# Patient Record
Sex: FEMALE | Race: WHITE | Employment: STUDENT | URBAN - METROPOLITAN AREA
[De-identification: names, ages, dates, MRNs, and addresses within clinical notes are randomized per-mention and may not be internally consistent; named-entity substitution may affect disease eponyms.]

---

## 2021-04-10 ENCOUNTER — HOSPITAL ENCOUNTER (EMERGENCY)
Age: 31
Discharge: HOME | End: 2021-04-11
Payer: OTHER GOVERNMENT

## 2021-04-10 ENCOUNTER — APPOINTMENT (OUTPATIENT)
Dept: GENERAL RADIOLOGY | Age: 31
End: 2021-04-10
Attending: EMERGENCY MEDICINE
Payer: OTHER GOVERNMENT

## 2021-04-10 VITALS
RESPIRATION RATE: 16 BRPM | SYSTOLIC BLOOD PRESSURE: 150 MMHG | HEIGHT: 66 IN | OXYGEN SATURATION: 97 % | HEART RATE: 72 BPM | DIASTOLIC BLOOD PRESSURE: 95 MMHG | BODY MASS INDEX: 37.77 KG/M2 | WEIGHT: 235 LBS | TEMPERATURE: 97.7 F

## 2021-04-10 DIAGNOSIS — M79.671 RIGHT FOOT PAIN: Primary | ICD-10-CM

## 2021-04-10 DIAGNOSIS — Z98.890 STATUS POST SURGERY: ICD-10-CM

## 2021-04-10 DIAGNOSIS — M21.961 DEFORMITY OF RIGHT FOOT: ICD-10-CM

## 2021-04-10 PROCEDURE — 73630 X-RAY EXAM OF FOOT: CPT

## 2021-04-10 PROCEDURE — 99283 EMERGENCY DEPT VISIT LOW MDM: CPT

## 2021-04-10 RX ORDER — IBUPROFEN 800 MG/1
800 TABLET ORAL
Status: DISCONTINUED | OUTPATIENT
Start: 2021-04-10 | End: 2021-04-11

## 2021-04-10 RX ORDER — IBUPROFEN 800 MG/1
800 TABLET ORAL
Status: COMPLETED | OUTPATIENT
Start: 2021-04-10 | End: 2021-04-11

## 2021-04-11 PROCEDURE — 74011250637 HC RX REV CODE- 250/637: Performed by: NURSE PRACTITIONER

## 2021-04-11 RX ORDER — IBUPROFEN 600 MG/1
600 TABLET ORAL
Qty: 20 TAB | Refills: 0 | OUTPATIENT
Start: 2021-04-11 | End: 2021-04-25

## 2021-04-11 RX ADMIN — IBUPROFEN 800 MG: 800 TABLET, FILM COATED ORAL at 00:28

## 2021-04-11 NOTE — DISCHARGE INSTRUCTIONS
Thank you! Thank you for allowing me to care for you in the emergency department. I sincerely hope that you are satisfied with your visit today. It is my goal to provide you with excellent care. Below you will find a list of your labs and imaging from your visit today. Should you have any questions regarding these results please do not hesitate to call the emergency department. Labs -   No results found for this or any previous visit (from the past 12 hour(s)). Radiologic Studies -   XR FOOT RT MIN 3 V   Final Result        CT Results  (Last 48 hours)      None          CXR Results  (Last 48 hours)      None               If you feel that you have not received excellent quality care or timely care, please ask to speak to the nurse manager. Please choose us in the future for your continued health care needs. ------------------------------------------------------------------------------------------------------------  The exam and treatment you received in the Emergency Department were for an urgent problem and are not intended as complete care. It is important that you follow-up with a doctor, nurse practitioner, or physician assistant to:  (1) confirm your diagnosis,  (2) re-evaluation of changes in your illness and treatment, and  (3) for ongoing care. If your symptoms become worse or you do not improve as expected and you are unable to reach your usual health care provider, you should return to the Emergency Department. We are available 24 hours a day. Please take your discharge instructions with you when you go to your follow-up appointment. If you have any problem arranging a follow-up appointment, contact the Emergency Department immediately. If a prescription has been provided, please have it filled as soon as possible to prevent a delay in treatment. Read the entire medication instruction sheet provided to you by the pharmacy.  If you have any questions or reservations about taking the medication due to side effects or interactions with other medications, please call your primary care physician or contact the ER to speak with the charge nurse. Make an appointment with your family doctor or the physician you were referred to for follow-up of this visit as instructed on your discharge paperwork, as this is a mandatory follow-up. Return to the ER if you are unable to be seen or if you are unable to be seen in a timely manner. If you have any problem arranging the follow-up visit, contact the Emergency Department immediately.

## 2021-04-11 NOTE — ED PROVIDER NOTES
EMERGENCY DEPARTMENT HISTORY AND PHYSICAL EXAM      Date: 4/10/2021  Patient Name: Swetha Santoyo    History of Presenting Illness     Chief Complaint   Patient presents with    Foot Pain       History Provided By: Patient    HPI: Swetha Santoyo, 35 y.o. female with a past medical history significant surgery to right foot  Months ago presents to the ED with cc of fracture to right foot. Patient complains of pain to the lateral side of the right foot 7 out of 10, constant, aggravated with ambulation and palpation. She reports Monday was moving from [facility] to [place] when she turned when her foot turned and twisted on the ground she felt the snap. She reports being evaluated with advised of fracture given a walking boot and crutches and need to follow-up with orthopedic once she relocated. She reports being given hydrocodone for with a 4-day supply out of medications continues with pain. She reports use of naproxen with no improvement mild numbness and tingling to lower extremities with edema. She denies any shortness of breath, chest pain, fever, chills, drainage, warmth, abrasion, or laceration. There are no other complaints, changes, or physical findings at this time. PCP: No primary care provider on file. No current facility-administered medications on file prior to encounter. No current outpatient medications on file prior to encounter. Past History     Past Medical History:  Past Medical History:   Diagnosis Date    Arthritis     Hashimoto's disease     Mastocytosis        Past Surgical History:  Past Surgical History:   Procedure Laterality Date    HX GI      gallbladder    HX GYN      c section and tubal ligation    HX ORTHOPAEDIC         Family History:  History reviewed. No pertinent family history.     Social History:  Social History     Tobacco Use    Smoking status: Former Smoker    Smokeless tobacco: Never Used    Tobacco comment: quit 10 years ago 2011 Substance Use Topics    Alcohol use: Never     Frequency: Never    Drug use: Not on file       Allergies: Allergies   Allergen Reactions    Latex Hives    Pcn [Penicillins] Anaphylaxis    Percocet [Oxycodone-Acetaminophen] Anaphylaxis    Tetanus Vaccines And Toxoid Anaphylaxis         Review of Systems     Review of Systems   Constitutional: Negative for chills and fever. HENT: Negative for congestion, sinus pressure and sinus pain. Respiratory: Negative for cough and shortness of breath. Cardiovascular: Negative for chest pain and leg swelling. Gastrointestinal: Negative for abdominal pain, nausea and vomiting. Genitourinary: Negative for dysuria, frequency and urgency. Musculoskeletal: Positive for arthralgias and gait problem. Negative for myalgias. Skin: Negative. Neurological: Negative for dizziness, weakness, light-headedness, numbness and headaches. Psychiatric/Behavioral: Negative. Physical Exam     Physical Exam  Vitals signs and nursing note reviewed. Constitutional:       General: She is not in acute distress. Appearance: Normal appearance. She is normal weight. She is not ill-appearing or toxic-appearing. HENT:      Head: Normocephalic and atraumatic. Right Ear: Hearing normal.      Left Ear: Hearing normal.      Nose: Nose normal.      Mouth/Throat:      Mouth: Mucous membranes are moist.   Eyes:      General: Lids are normal.      Extraocular Movements: Extraocular movements intact. Pupils: Pupils are equal, round, and reactive to light. Neck:      Musculoskeletal: Normal range of motion and neck supple. No muscular tenderness. Cardiovascular:      Rate and Rhythm: Normal rate and regular rhythm. Pulses: Normal pulses. Radial pulses are 2+ on the right side and 2+ on the left side. Dorsalis pedis pulses are 2+ on the right side and 2+ on the left side.    Pulmonary:      Effort: Pulmonary effort is normal. No accessory muscle usage or respiratory distress. Breath sounds: Normal breath sounds. No wheezing or rhonchi. Abdominal:      General: Bowel sounds are normal.      Palpations: Abdomen is soft. Tenderness: There is no abdominal tenderness. There is no right CVA tenderness or left CVA tenderness. Musculoskeletal:      Right lower leg: No edema. Left lower leg: No edema. Feet:      Right foot:      Skin integrity: No skin breakdown, erythema or warmth. Left foot:      Skin integrity: No skin breakdown, erythema or warmth. Comments: Skin intact, surgical scar present. No erythema, edema, warmth, drainage, abrasion, laceration present to site. Sensation presnt bilaterally, 2+ pedal and popliteal pulses. Pain with palpation and flexion and extension foot. Skin:     General: Skin is warm and dry. Capillary Refill: Capillary refill takes less than 2 seconds. Findings: No abrasion, bruising, ecchymosis, erythema or signs of injury. Neurological:      Mental Status: She is alert and oriented to person, place, and time. GCS: GCS eye subscore is 4. GCS verbal subscore is 5. GCS motor subscore is 6. Cranial Nerves: Cranial nerves are intact. Sensory: Sensation is intact. Psychiatric:         Attention and Perception: Attention normal.         Mood and Affect: Mood normal.         Behavior: Behavior normal. Behavior is cooperative. Cognition and Memory: Cognition normal.          Lab and Diagnostic Study Results     Labs -   No results found for this or any previous visit (from the past 12 hour(s)). Radiologic Studies -   XR Results (most recent):  Results from Hospital Encounter encounter on 04/10/21   XR FOOT RT MIN 3 V    Narrative Indication: Fracture. 3 views right foot 4/10/2021. No comparison. Osteotomy with bridging staple proximal aspect first proximal phalanx. Lateral  view of retained calculus is not apparent.   Postsurgical deformity distal aspect first metatarsal bone. Osteotomy of distal second metatarsal bone with overlapping components,  foreshortening and a vertical transverse screw in the neck. There is an intramedullary indu, with distal transverse screw, associated with  osteotomy of fifth metatarsal bone. There is lucency surrounding the metal  components consistent with loosening. The distal osteotomy bone component is  nearly one shaft width displaced medially, without apparent bridging callus. CT Results  (Last 48 hours)    None        CXR Results  (Last 48 hours)    None            Medical Decision Making   - I am the first provider for this patient. - I reviewed the vital signs, available nursing notes, past medical history, past surgical history, family history and social history. - Initial assessment performed. The patients presenting problems have been discussed, and they are in agreement with the care plan formulated and outlined with them. I have encouraged them to ask questions as they arise throughout their visit. Vital Signs-Reviewed the patient's vital signs. Patient Vitals for the past 12 hrs:   Temp Pulse Resp BP SpO2   04/10/21 2247 97.7 °F (36.5 °C) 72 16 (!) 150/95 97 %       The patient presents with foot pain with a differential diagnosis of surgical infection, hardware displacement, fx,        ED Course:          Provider Notes (Medical Decision Making):   NO acute fracture noted. Displacement of medal hardware reported. Pt given referral to ortho. Advised to continue with supportive care, walking boot, crutches. Pt verbalized understanding. Stable at time of discharge. Procedures   Medical Decision Makingedical Decision Making  Performed by: Cory Ying NP  PROCEDURES:  Procedures       Disposition   Disposition: DC- Adult Discharges: All of the diagnostic tests were reviewed and questions answered. Diagnosis, care plan and treatment options were discussed.   The patient understands the instructions and will follow up as directed. The patients results have been reviewed with them. They have been counseled regarding their diagnosis. The patient verbally convey understanding and agreement of the signs, symptoms, diagnosis, treatment and prognosis and additionally agrees to follow up as recommended with their PCP in 24 - 48 hours. They also agree with the care-plan and convey that all of their questions have been answered. I have also put together some discharge instructions for them that include: 1) educational information regarding their diagnosis, 2) how to care for their diagnosis at home, as well a 3) list of reasons why they would want to return to the ED prior to their follow-up appointment, should their condition change. DISCHARGE PLAN:  1. There are no discharge medications for this patient. 2.   Follow-up Information     Follow up With Specialties Details Why Contact Info    Iram Galeano MD Orthopedic Surgery Schedule an appointment as soon as possible for a visit in 1 week  [place] [address]  [phone]          3. Return to ED if worse   4. Current Discharge Medication List      START taking these medications    Details   ibuprofen (MOTRIN) 600 mg tablet Take 1 Tab by mouth every six (6) hours as needed for Pain. Qty: 20 Tab, Refills: 0               Diagnosis     Clinical Impression:   1. Right foot pain    2. Status post surgery    3. Deformity of right foot        Attestations:    Sunny Hein NP    Please note that this dictation was completed with [employer], the computer voice recognition software. Quite often unanticipated grammatical, syntax, homophones, and other interpretive errors are inadvertently transcribed by the computer software. Please disregard these errors. Please excuse any errors that have escaped final proofreading. Thank you.

## 2021-04-11 NOTE — ED TRIAGE NOTES
Pt has a broke foot foot was broken about 1 week ago pt denies any trauma has a hx of RA.  Pt in [employer]

## 2021-04-25 ENCOUNTER — HOSPITAL ENCOUNTER (EMERGENCY)
Age: 31
Discharge: HOME | End: 2021-04-25
Payer: OTHER GOVERNMENT

## 2021-04-25 VITALS
HEART RATE: 61 BPM | WEIGHT: 230 LBS | OXYGEN SATURATION: 99 % | DIASTOLIC BLOOD PRESSURE: 72 MMHG | HEIGHT: 66 IN | TEMPERATURE: 97.9 F | SYSTOLIC BLOOD PRESSURE: 141 MMHG | BODY MASS INDEX: 36.96 KG/M2 | RESPIRATION RATE: 16 BRPM

## 2021-04-25 DIAGNOSIS — M54.41 LOW BACK PAIN WITH RIGHT-SIDED SCIATICA, UNSPECIFIED BACK PAIN LATERALITY, UNSPECIFIED CHRONICITY: ICD-10-CM

## 2021-04-25 DIAGNOSIS — M79.651 ACUTE PAIN OF RIGHT THIGH: Primary | ICD-10-CM

## 2021-04-25 DIAGNOSIS — Z98.890 STATUS POST RIGHT FOOT SURGERY: ICD-10-CM

## 2021-04-25 PROCEDURE — 99284 EMERGENCY DEPT VISIT MOD MDM: CPT

## 2021-04-25 PROCEDURE — 74011250636 HC RX REV CODE- 250/636: Performed by: NURSE PRACTITIONER

## 2021-04-25 PROCEDURE — 74011250637 HC RX REV CODE- 250/637: Performed by: NURSE PRACTITIONER

## 2021-04-25 PROCEDURE — 96372 THER/PROPH/DIAG INJ SC/IM: CPT

## 2021-04-25 RX ORDER — NAPROXEN 500 MG/1
1 TABLET, DELAYED RELEASE ORAL 2 TIMES DAILY
COMMUNITY
Start: 2021-04-14

## 2021-04-25 RX ORDER — LEVOTHYROXINE SODIUM 150 UG/1
1 TABLET ORAL DAILY
COMMUNITY
Start: 2021-04-14

## 2021-04-25 RX ORDER — CYCLOBENZAPRINE HCL 10 MG
10 TABLET ORAL
Status: COMPLETED | OUTPATIENT
Start: 2021-04-25 | End: 2021-04-25

## 2021-04-25 RX ORDER — HYDROCODONE BITARTRATE AND ACETAMINOPHEN 5; 325 MG/1; MG/1
2 TABLET ORAL
Status: COMPLETED | OUTPATIENT
Start: 2021-04-25 | End: 2021-04-25

## 2021-04-25 RX ORDER — PREGABALIN 150 MG/1
1 CAPSULE ORAL 2 TIMES DAILY
COMMUNITY
Start: 2021-04-14

## 2021-04-25 RX ORDER — NAPROXEN 500 MG/1
500 TABLET ORAL
Status: COMPLETED | OUTPATIENT
Start: 2021-04-25 | End: 2021-04-25

## 2021-04-25 RX ORDER — CYCLOBENZAPRINE HCL 10 MG
10 TABLET ORAL
Qty: 15 TAB | Refills: 0 | Status: SHIPPED | OUTPATIENT
Start: 2021-04-25 | End: 2021-04-30

## 2021-04-25 RX ORDER — LEFLUNOMIDE 20 MG/1
1 TABLET ORAL DAILY
COMMUNITY
Start: 2021-04-14

## 2021-04-25 RX ORDER — HYDROXYCHLOROQUINE SULFATE 200 MG/1
1 TABLET, FILM COATED ORAL 2 TIMES DAILY
COMMUNITY
Start: 2021-04-14

## 2021-04-25 RX ORDER — PREDNISONE 5 MG/1
1 TABLET ORAL
COMMUNITY
Start: 2021-04-14

## 2021-04-25 RX ORDER — HYDROMORPHONE HYDROCHLORIDE 1 MG/ML
1 INJECTION, SOLUTION INTRAMUSCULAR; INTRAVENOUS; SUBCUTANEOUS ONCE
Status: COMPLETED | OUTPATIENT
Start: 2021-04-25 | End: 2021-04-25

## 2021-04-25 RX ORDER — HYDROCODONE BITARTRATE AND ACETAMINOPHEN 7.5; 325 MG/1; MG/1
TABLET ORAL
COMMUNITY
Start: 2021-04-15 | End: 2021-05-02

## 2021-04-25 RX ADMIN — HYDROCODONE BITARTRATE AND ACETAMINOPHEN 2 TABLET: 5; 325 TABLET ORAL at 19:08

## 2021-04-25 RX ADMIN — NAPROXEN 500 MG: 500 TABLET ORAL at 19:08

## 2021-04-25 RX ADMIN — METHYLPREDNISOLONE SODIUM SUCCINATE 125 MG: 125 INJECTION, POWDER, FOR SOLUTION INTRAMUSCULAR; INTRAVENOUS at 20:45

## 2021-04-25 RX ADMIN — HYDROMORPHONE HYDROCHLORIDE 1 MG: 1 INJECTION, SOLUTION INTRAMUSCULAR; INTRAVENOUS; SUBCUTANEOUS at 20:57

## 2021-04-25 RX ADMIN — CYCLOBENZAPRINE 10 MG: 10 TABLET, FILM COATED ORAL at 19:07

## 2021-04-25 NOTE — ED TRIAGE NOTES
Foot surgery on Friday at [facility]. Now noticing increased swelling and pain all the way up to thigh. Called surgeon prior to arrival, who sent patient to closest ED.

## 2021-04-25 NOTE — ED PROVIDER NOTES
EMERGENCY DEPARTMENT HISTORY AND PHYSICAL EXAM      Date: 4/25/2021  Patient Name: Swetha Santoyo    History of Presenting Illness     Chief Complaint   Patient presents with    Leg Swelling    Leg Pain    Post OP Complication    Back Pain       History Provided By: Patient    HPI: Swetha Santoyo, 35 y.o. female with a past medical history significant for RA, arthritis, lumbar stenosis presents to the ED with cc of right leg pain. Patient is s/p right fourth and fifth metatarsal displaced fracture and hammertoe revision surgery on 4/23/21 at [facility]. She cannot recall the surgeon's name. She currently has a nerve block in place; she denies any specific foot complaints stating \"I cannot feel anything so I would not know\". She reports progressing right posterior thigh pain radiating to her buttocks since yesterday. She is in moderate amount of distress secondary to discomfort, she states no position or medicines are helping. She states she was previously being worked up for lumbar stenosis, prior to this foot injury/surgery. She states she tried Robaxin previously given for lumbar radiculopathy and completed a 6-day prednisone taper today with no improvement. She is currently using crutches. States she called the 800-number given to her post surgery and was advised to follow-up at the ED. She denies any associated fever/chills, SOB, CP and is a non-smoker. She denies any saddle numbness changes in bowel/bladder. She describes her pain as severe, \"charley horse-like\", 10/10 presently. There are no other complaints, changes, or physical findings at this time. PCP: Unknown, Provider    No current facility-administered medications on file prior to encounter. Current Outpatient Medications on File Prior to Encounter   Medication Sig Dispense Refill    naproxen EC (NAPROSYN EC) 500 mg EC tablet Take 1 Tab by mouth two (2) times a day.       HYDROcodone-acetaminophen (NORCO) 7.5-325 mg per tablet 1 tab, PO, every 4 hours, PRN: for pain, 0 Refills, Dispense: 42, tab      levothyroxine (SYNTHROID) 150 mcg tablet Take 1 Tab by mouth daily.  pregabalin (LYRICA) 150 mg capsule Take 1 Cap by mouth two (2) times a day.  predniSONE (DELTASONE) 5 mg tablet Take 1 Tab by mouth Follow dosing instructions. 5      hydrOXYchloroQUINE (Plaquenil) 200 mg tablet Take 1 Tab by mouth two (2) times a day.  esomeprazole magnesium (NEXIUM PO) Take 40 mg by mouth daily.  leflunomide (ARAVA) 20 mg tablet Take 1 Tab by mouth daily.  ropivacaine in 0.9% sod chl/PF (ropivacaine,PF,-0.9 % sodchlor) 0.2 750 mL elpl 4 mL/hr by Local Infiltration route continuous. Inserted R leg/thigh      methocarbamol (ROBAXIN PO) Take  by mouth.  [DISCONTINUED] ibuprofen (MOTRIN) 600 mg tablet Take 1 Tab by mouth every six (6) hours as needed for Pain. 20 Tab 0       Past History     Past Medical History:  Past Medical History:   Diagnosis Date    Arthritis     Hashimoto's disease     Mastocytosis        Past Surgical History:  Past Surgical History:   Procedure Laterality Date    HX GI      gallbladder    HX GYN      c section and tubal ligation    HX ORTHOPAEDIC         Family History:  History reviewed. No pertinent family history. Social History:  Social History     Tobacco Use    Smoking status: Former Smoker    Smokeless tobacco: Never Used    Tobacco comment: quit 10 years ago 2011   Substance Use Topics    Alcohol use: Yes     Frequency: Never     Comment: rarely    Drug use: Never       Allergies: Allergies   Allergen Reactions    Latex Hives    Pcn [Penicillins] Anaphylaxis    Percocet [Oxycodone-Acetaminophen] Anaphylaxis    Tetanus Vaccines And Toxoid Anaphylaxis         Review of Systems     Review of Systems   Respiratory: Negative for shortness of breath. Cardiovascular: Negative for chest pain. Musculoskeletal: Positive for arthralgias, back pain, gait problem and joint swelling. All other systems reviewed and are negative. Physical Exam     Physical Exam  Vitals signs and nursing note reviewed. Constitutional:       General: She is in acute distress. Appearance: Normal appearance. She is overweight. Eyes:      Extraocular Movements: Extraocular movements intact. Conjunctiva/sclera: Conjunctivae normal.   Cardiovascular:      Rate and Rhythm: Normal rate and regular rhythm. Pulses:           Popliteal pulses are 1+ on the right side. Heart sounds: Normal heart sounds. Pulmonary:      Effort: Pulmonary effort is normal.      Breath sounds: Normal breath sounds. No wheezing or rales. Musculoskeletal:      Lumbar back: She exhibits decreased range of motion, tenderness and pain. She exhibits no bony tenderness and no swelling. Right upper leg: She exhibits tenderness. She exhibits no bony tenderness and no swelling. Right lower leg: She exhibits no tenderness. Comments: Right lower leg bulky dressing, no swelling, erythema or warmth appreciated, decreased sensation 2/2 nerve block, cap refill normal, toes warm to touch; nerve block catheter right lateral knee, no surrounding erythema warmth or tenderness; lumbar back: No midline tenderness, no step off. +right SI joint and gluteal tenderness. positive SLR. Skin:     General: Skin is warm and dry. Neurological:      General: No focal deficit present. Mental Status: She is alert. Psychiatric:         Mood and Affect: Mood is anxious. Behavior: Behavior normal. Behavior is cooperative. Lab and Diagnostic Study Results     Labs -   No results found for this or any previous visit (from the past 12 hour(s)). Radiologic Studies -   @lastxrresult@  CT Results  (Last 48 hours)    None        CXR Results  (Last 48 hours)    None            Medical Decision Making   - I am the first provider for this patient.     - I reviewed the vital signs, available nursing notes, past medical history, past surgical history, family history and social history. - Initial assessment performed. The patients presenting problems have been discussed, and they are in agreement with the care plan formulated and outlined with them. I have encouraged them to ask questions as they arise throughout their visit. Vital Signs-Reviewed the patient's vital signs. Patient Vitals for the past 12 hrs:   Temp Pulse Resp BP SpO2   04/25/21 2055 -- 65 -- -- 98 %   04/25/21 2015 -- 63 16 (!) 128/94 100 %   04/25/21 1905 -- (!) 56 18 121/71 99 %   04/25/21 1751 97.9 °F (36.6 °C) 68 22 136/87 100 %       Records Reviewed: Nursing Notes and outside records    The patient presents with leg pain with a differential diagnosis of arthritis, DVT, radiculopathy      ED Course:   Pt presents with worsening right upper leg and back pain since foot surgery 2 days ago. He reports a recent history of ongoing states she was being worked up/evaluated prior to her emergency foot surgery. She completed a steroid Dosepak today and has been taking NSAIDs and muscle relaxers as previously prescribed with no improvement. She was treated with Solu-Medrol 125 mg IM, Flexeril, naproxen, hydrocodone and IM Dilaudid with mild improvement. She was ambulatory with crutches upon discharge. SHe denies any specific foot/surgery related pain or complaints however she does have a nerve block in place so exam is limited. Patient will return in a.m. for outpatient Doppler to rule out DVT though low suspicion, low risk factors. She was advised to contact surgeon in the a.m. to evaluate nerve block catheter. Will discharge with local orthopedics MD for not improving symptoms.     ED Course as of Apr 25 2125   Sun Apr 25, 2021 1900 Ice pack applied    [LP]   1943 Pt reports some improvement in pain \"but not much\"    [LP]   2022 Pt continues to complain of minimal to no relief and states she cannot walk    [LP]   2035 Patient seen ambulating in the hallway with her crutches to the bathroom and then proceeded to climb over side rails to get back in bed. [LP]      ED Course User Index  [LP] Esequiel Cole NP       Provider Notes (Medical Decision Making):     MDM  Number of Diagnoses or Management Options  Acute pain of right thigh: new, needed workup  Low back pain with right-sided sciatica, unspecified back pain laterality, unspecified chronicity: established, worsening  Status post right foot surgery: established, improving     Amount and/or Complexity of Data Reviewed  Discuss the patient with other providers: yes    Risk of Complications, Morbidity, and/or Mortality  Presenting problems: moderate  Diagnostic procedures: low  Management options: low    Patient Progress  Patient progress: stable         Disposition   Disposition: Condition stable  DC-The patient was given verbal follow-up instructions  DC- Pain Control DC Home plan: Nonsteroidals, Tylenol, Narcotic pain medication, Muscle relaxants and Referral Orthopedics    Discharged    DISCHARGE PLAN:  1. Current Discharge Medication List      CONTINUE these medications which have NOT CHANGED    Details   ibuprofen (MOTRIN) 600 mg tablet Take 1 Tab by mouth every six (6) hours as needed for Pain. Qty: 20 Tab, Refills: 0           2. Follow-up Information     Follow up With Specialties Details Why Contact Info    Robbie Tohrpe MD Orthopedic Surgery Schedule an appointment as soon as possible for a visit  orthopedics, If symptoms worsen [address]  [phone]      Your PCP and surgeon  Schedule an appointment as soon as possible for a visit  for ER follow up         3. Return to ED if worse   4. Current Discharge Medication List      START taking these medications    Details   cyclobenzaprine (FLEXERIL) 10 mg tablet Take 1 Tab by mouth three (3) times daily as needed for Muscle Spasm(s) for up to 5 days.   Qty: 15 Tab, Refills: 0 Diagnosis     Clinical Impression:   1. Acute pain of right thigh    2. Low back pain with right-sided sciatica, unspecified back pain laterality, unspecified chronicity    3. Status post right foot surgery        Attestations:    Malia Ferguson NP    Please note that this dictation was completed with [employer], the computer voice recognition software. Quite often unanticipated grammatical, syntax, homophones, and other interpretive errors are inadvertently transcribed by the computer software. Please disregard these errors. Please excuse any errors that have escaped final proofreading. Thank you.

## 2021-04-26 ENCOUNTER — TRANSCRIBE ORDER (OUTPATIENT)
Dept: REGISTRATION | Age: 31
End: 2021-04-26

## 2021-04-26 ENCOUNTER — HOSPITAL ENCOUNTER (OUTPATIENT)
Dept: NON INVASIVE DIAGNOSTICS | Age: 31
Discharge: HOME | End: 2021-04-26
Payer: OTHER GOVERNMENT

## 2021-04-26 DIAGNOSIS — Z98.890 STATUS POST RIGHT FOOT SURGERY: ICD-10-CM

## 2021-04-26 DIAGNOSIS — M79.609 LIMB PAIN: Primary | ICD-10-CM

## 2021-04-26 DIAGNOSIS — M79.651 ACUTE PAIN OF RIGHT THIGH: ICD-10-CM

## 2021-04-26 PROCEDURE — 93971 EXTREMITY STUDY: CPT

## 2021-04-26 NOTE — DISCHARGE INSTRUCTIONS
Follow-up tomorrow for outpatient Doppler to rule out blood clot. I recommend you contact your surgeon tomorrow morning.

## 2021-05-18 ENCOUNTER — HOSPITAL ENCOUNTER (EMERGENCY)
Age: 31
Discharge: HOME | End: 2021-05-18
Attending: EMERGENCY MEDICINE
Payer: OTHER GOVERNMENT

## 2021-05-18 VITALS
WEIGHT: 228 LBS | HEIGHT: 66 IN | TEMPERATURE: 97.8 F | BODY MASS INDEX: 36.64 KG/M2 | SYSTOLIC BLOOD PRESSURE: 153 MMHG | DIASTOLIC BLOOD PRESSURE: 100 MMHG | RESPIRATION RATE: 18 BRPM | OXYGEN SATURATION: 98 % | HEART RATE: 84 BPM

## 2021-05-18 DIAGNOSIS — G62.9 PERIPHERAL POLYNEUROPATHY: ICD-10-CM

## 2021-05-18 DIAGNOSIS — M79.671 RIGHT FOOT PAIN: Primary | ICD-10-CM

## 2021-05-18 PROCEDURE — 99282 EMERGENCY DEPT VISIT SF MDM: CPT

## 2021-05-18 PROCEDURE — 74011250636 HC RX REV CODE- 250/636: Performed by: EMERGENCY MEDICINE

## 2021-05-18 PROCEDURE — 96372 THER/PROPH/DIAG INJ SC/IM: CPT

## 2021-05-18 RX ORDER — NAPROXEN 500 MG/1
500 TABLET ORAL 2 TIMES DAILY WITH MEALS
Qty: 20 TAB | Refills: 0 | Status: SHIPPED | OUTPATIENT
Start: 2021-05-18

## 2021-05-18 RX ORDER — PREDNISONE 50 MG/1
50 TABLET ORAL DAILY
Qty: 6 TAB | Refills: 1 | Status: SHIPPED | OUTPATIENT
Start: 2021-05-18 | End: 2021-05-24

## 2021-05-18 RX ORDER — KETOROLAC TROMETHAMINE 30 MG/ML
30 INJECTION, SOLUTION INTRAMUSCULAR; INTRAVENOUS
Status: COMPLETED | OUTPATIENT
Start: 2021-05-18 | End: 2021-05-18

## 2021-05-18 RX ADMIN — KETOROLAC TROMETHAMINE 30 MG: 30 INJECTION, SOLUTION INTRAMUSCULAR; INTRAVENOUS at 18:53

## 2021-05-18 NOTE — ED PROVIDER NOTES
EMERGENCY DEPARTMENT HISTORY AND PHYSICAL EXAM      Date: 5/18/2021  Patient Name: Regine Ackerman    History of Presenting Illness     Chief Complaint   Patient presents with    Other    Foot Pain       History Provided By: Patient    HPI: Regine Ackerman, 35 y.o. female presents to the ED with cc of   Chief Complaint   Patient presents with    Other    Foot Pain     Patient reports that she is 4 weeks post op from a second revision of a foot surgery. Reports that she is having increased pain and sensitivity to right foot. Reports that pain is interfering with her sleep as well. Patient was told that she had peripheral neuropathy and or sciatic  Follow-up with her orthopedic later this week  Denies any fever cough shortness of breath or calf pain        There are no other complaints, changes, or physical findings at this time. PCP: Unknown, Provider    No current facility-administered medications on file prior to encounter. Current Outpatient Medications on File Prior to Encounter   Medication Sig Dispense Refill    naproxen EC (NAPROSYN EC) 500 mg EC tablet Take 1 Tab by mouth two (2) times a day.  levothyroxine (SYNTHROID) 150 mcg tablet Take 1 Tab by mouth daily.  pregabalin (LYRICA) 150 mg capsule Take 1 Cap by mouth two (2) times a day.  predniSONE (DELTASONE) 5 mg tablet Take 1 Tab by mouth Follow dosing instructions. 5      hydrOXYchloroQUINE (Plaquenil) 200 mg tablet Take 1 Tab by mouth two (2) times a day.  esomeprazole magnesium (NEXIUM PO) Take 40 mg by mouth daily.  leflunomide (ARAVA) 20 mg tablet Take 1 Tab by mouth daily.  methocarbamol (ROBAXIN PO) Take  by mouth.          Past History     Past Medical History:  Past Medical History:   Diagnosis Date    Arthritis     Hashimoto's disease     Mastocytosis        Past Surgical History:  Past Surgical History:   Procedure Laterality Date    HX GI      gallbladder    HX GYN      c section and tubal ligation    HX ORTHOPAEDIC         Family History:  No family history on file. Social History:  Social History     Tobacco Use    Smoking status: Former Smoker    Smokeless tobacco: Never Used    Tobacco comment: quit 10 years ago 2011   Substance Use Topics    Alcohol use: Yes     Frequency: Never     Comment: rarely    Drug use: Never       Allergies: Allergies   Allergen Reactions    Latex Hives    Pcn [Penicillins] Anaphylaxis    Percocet [Oxycodone-Acetaminophen] Anaphylaxis    Tetanus Vaccines And Toxoid Anaphylaxis         Review of Systems   Review of Systems   Constitutional: Negative. HENT: Negative for congestion, facial swelling, rhinorrhea and sore throat. Eyes: Negative. Negative for photophobia and pain. Respiratory: Negative for cough, shortness of breath and wheezing. Cardiovascular: Negative. Negative for chest pain. Gastrointestinal: Negative for abdominal distention and abdominal pain. Genitourinary: Negative. Musculoskeletal: Positive for back pain and myalgias. Allergic/Immunologic: Negative for immunocompromised state. Neurological: Negative. Negative for syncope and weakness. Hematological: Negative. Psychiatric/Behavioral: Negative. Physical Exam   Physical Exam  Vitals signs and nursing note reviewed. Constitutional:       Appearance: Normal appearance. She is normal weight. HENT:      Head: Normocephalic and atraumatic. Nose: No congestion or rhinorrhea. Eyes:      Extraocular Movements: Extraocular movements intact. Pupils: Pupils are equal, round, and reactive to light. Neck:      Musculoskeletal: Normal range of motion and neck supple. Cardiovascular:      Rate and Rhythm: Normal rate and regular rhythm. Pulmonary:      Effort: Pulmonary effort is normal.      Breath sounds: Normal breath sounds. Abdominal:      General: Abdomen is flat. Bowel sounds are normal. There is no distension. Tenderness:  There is no abdominal tenderness. There is no guarding. Musculoskeletal: Normal range of motion. Feet:    Feet:      Left foot:      Skin integrity: No ulcer, skin breakdown, erythema, warmth or callus. Comments: Pain and paresthesias  Skin:     General: Skin is warm and dry. Neurological:      General: No focal deficit present. Mental Status: She is alert and oriented to person, place, and time. Psychiatric:         Mood and Affect: Mood normal.         Diagnostic Study Results     Labs -   No results found for this or any previous visit (from the past 12 hour(s)). Labs reviewed by me    Radiologic Studies -   No orders to display     CT Results  (Last 48 hours)    None        CXR Results  (Last 48 hours)    None            Medical Decision Making     I am the first provider for this patient. I reviewed the vital signs, available nursing notes, past medical history, past surgical history, family history and social history. RADIOLOGY report and LABS reviewed by me    Vital Signs-Reviewed the patient's vital signs. Patient Vitals for the past 12 hrs:   Temp Pulse Resp BP SpO2   05/18/21 1651 97.8 °F (36.6 °C) 84 18 (!) 153/100 98 %       EKG interpretation: (Preliminary)        Records Reviewed: Nurse's note. Provider Notes (Medical Decision Making):    Patient presents with DIFF DX :         ED Course:   Initial assessment performed. The patients presenting problems have been discussed, and they are in agreement with the care plan formulated and outlined with them. I have encouraged them to ask questions as they arise throughout their visit. TREATMENT RESPONSE -Stable          Dutch Mario MD      Disposition:  Discharged   Diagnostic tests were reviewed and questions answered. Diagnosis, care plan and treatment options were discussed. The patient understand instructions and will follow up as directed.   Condition stable    Admitting Provider:  No admitting provider for patient encounter. Consulting Provider:  No ref. provider found       DISCHARGE PLAN:  1. Current Discharge Medication List      START taking these medications    Details   naproxen (NAPROSYN) 500 mg tablet Take 1 Tab by mouth two (2) times daily (with meals). Qty: 20 Tab, Refills: 0  Start date: 5/18/2021      !! predniSONE (DELTASONE) 50 mg tablet Take 1 Tab by mouth daily for 6 days. Indications: Cervical spondylosis  Qty: 6 Tab, Refills: 1  Start date: 5/18/2021, End date: 5/24/2021       !! - Potential duplicate medications found. Please discuss with provider. CONTINUE these medications which have NOT CHANGED    Details   naproxen EC (NAPROSYN EC) 500 mg EC tablet Take 1 Tab by mouth two (2) times a day. !! predniSONE (DELTASONE) 5 mg tablet Take 1 Tab by mouth Follow dosing instructions. 5       !! - Potential duplicate medications found. Please discuss with provider. 2.   Follow-up Information    None       3. Return to ED if worse     Diagnosis     Clinical Impression:     ICD-10-CM ICD-9-CM    1. Right foot pain  M79.671 729.5    2. Peripheral polyneuropathy  G62.9 356.9         Attestations:    Gloria Duong MD    Please note that this dictation was completed with [email or web address], the [employer] voice recognition software. Quite often unanticipated grammatical, syntax, homophones, and other interpretive errors are inadvertently transcribed by the computer software. Please disregard these errors. Please excuse any errors that have escaped final proofreading. Thank you.

## 2021-05-18 NOTE — ED TRIAGE NOTES
Patient reports that she is 4 weeks post op from a second revision of a foot surgery. Reports that she is having increased pain and sensitivity to right foot. Reports that pain is interfering with her sleep as well.

## 2023-05-15 RX ORDER — PREDNISONE 5 MG/1
1 TABLET ORAL
COMMUNITY
Start: 2021-04-14

## 2023-05-15 RX ORDER — LEVOTHYROXINE SODIUM 0.15 MG/1
1 TABLET ORAL DAILY
COMMUNITY
Start: 2021-04-14

## 2023-05-15 RX ORDER — NAPROXEN 500 MG/1
1 TABLET ORAL 2 TIMES DAILY
COMMUNITY
Start: 2021-04-14

## 2023-05-15 RX ORDER — HYDROXYCHLOROQUINE SULFATE 200 MG/1
1 TABLET, FILM COATED ORAL 2 TIMES DAILY
COMMUNITY
Start: 2021-04-14

## 2023-05-15 RX ORDER — PREGABALIN 150 MG/1
1 CAPSULE ORAL 2 TIMES DAILY
COMMUNITY
Start: 2021-04-14

## 2023-05-15 RX ORDER — LEFLUNOMIDE 20 MG/1
1 TABLET ORAL DAILY
COMMUNITY
Start: 2021-04-14

## 2023-05-15 RX ORDER — NAPROXEN 500 MG/1
500 TABLET ORAL 2 TIMES DAILY WITH MEALS
COMMUNITY
Start: 2021-05-18